# Patient Record
(demographics unavailable — no encounter records)

---

## 2025-06-26 NOTE — HISTORY OF PRESENT ILLNESS
[Premenarchal] : premenarchal [Headaches] : no headaches [Visual Symptoms] : no ~T visual symptoms [Polyuria] : no polyuria [Polydipsia] : no polydipsia [Knee Pain] : no knee pain [Hip Pain] : no hip pain [Personality Changes] : ~T no personality changes [Constipation] : no constipation [Cold Intolerance] : no cold intolerance [Fatigue] : no fatigue [Abdominal Pain] : no abdominal pain [Weight Loss] : no weight loss [Nausea] : no nausea [Vomiting] : no vomiting [Change in Skin Pigmentation] : no change in skin pigmentation [FreeTextEntry2] : Maura is a 12y9mo old F with autism, ADHD, anxiety and growth hormone deficiency.   Maura follows with Dr Hanson. She also has anxiety and ADHD who returns for follow up of growth hormone deficiency.  At visit in May 2024, growth continued to be suboptimal and a GHST performed on 6/25/24 and resulted in a low peak GH level of 6.59 ng/mL - consistent with partial GH deficiency. Added on prolactin and cortisol; prolactin normal, cortisol (3.4 ug/dL) low. May be due to late timing from test. Repeat AM cortisol normal. MRI brain without contrast on 9/21/24 was normal, including normal pituitary gland. GH was initiated at 1 mg daily (0.269 mg/kg/wk), family reports they started around end of December 2024.   Maura returns for f/u visit, accompanied by Mother. She has been taking GH 1mg daily (0.24mg/kg/week). She reports taking the medication herself, typically administering it in the thigh, denies any problems with site of injections. She denies any recent missed doses. She denies headache, hip/knee pain, limping, no polyuria/polydipsia/nocturia. She completed 7th grade. She participates in the band, playing percussion.  Maura currently takes Lexapro for anxiety and Adderall for ADHD.  Taking megace to stimulate appetite. Mother also mentions recent diagnosis with autism. She has started breast bud development recently - mother had not noticed prior to today's appointment and Maura unsure how long present. No menarche.

## 2025-06-26 NOTE — PHYSICAL EXAM
[Healthy Appearing] : healthy appearing [Well Nourished] : well nourished [Interactive] : interactive [Normal Appearance] : normal appearance [Well formed] : well formed [Normally Set] : normally set [Abdomen Soft] : soft [Abdomen Tenderness] : non-tender [] : no hepatosplenomegaly [1] : was Royal stage 1 [Royal Stage ___] : the Royal stage for breast development was [unfilled] [Normal] : normal  [FreeTextEntry1] : No actual breast buds palpated; flaccid tissue in the area of the areolas bilaterally  [Goiter] : no goiter [Scoliosis] : scoliosis not appreciated [FreeTextEntry2] : No axillary hair